# Patient Record
Sex: MALE | Race: ASIAN | Employment: STUDENT | URBAN - METROPOLITAN AREA
[De-identification: names, ages, dates, MRNs, and addresses within clinical notes are randomized per-mention and may not be internally consistent; named-entity substitution may affect disease eponyms.]

---

## 2018-12-31 ENCOUNTER — APPOINTMENT (OUTPATIENT)
Dept: LAB | Facility: HOSPITAL | Age: 33
End: 2018-12-31
Attending: PHYSICIAN ASSISTANT
Payer: COMMERCIAL

## 2018-12-31 ENCOUNTER — OFFICE VISIT (OUTPATIENT)
Dept: INTERNAL MEDICINE CLINIC | Facility: CLINIC | Age: 33
End: 2018-12-31
Payer: COMMERCIAL

## 2018-12-31 VITALS
HEIGHT: 64 IN | WEIGHT: 151 LBS | HEART RATE: 114 BPM | SYSTOLIC BLOOD PRESSURE: 138 MMHG | DIASTOLIC BLOOD PRESSURE: 83 MMHG | BODY MASS INDEX: 25.78 KG/M2

## 2018-12-31 DIAGNOSIS — Z23 NEED FOR VACCINATION: ICD-10-CM

## 2018-12-31 DIAGNOSIS — Z11.1 SCREENING-PULMONARY TB: ICD-10-CM

## 2018-12-31 DIAGNOSIS — Z76.89 ENCOUNTER TO ESTABLISH CARE: Primary | ICD-10-CM

## 2018-12-31 DIAGNOSIS — Z01.84 IMMUNITY STATUS TESTING: ICD-10-CM

## 2018-12-31 LAB — RUBV IGG SER-ACNC: 38.7 IU/ML

## 2018-12-31 PROCEDURE — 90472 IMMUNIZATION ADMIN EACH ADD: CPT | Performed by: PHYSICIAN ASSISTANT

## 2018-12-31 PROCEDURE — 90715 TDAP VACCINE 7 YRS/> IM: CPT | Performed by: PHYSICIAN ASSISTANT

## 2018-12-31 PROCEDURE — 86735 MUMPS ANTIBODY: CPT

## 2018-12-31 PROCEDURE — 99203 OFFICE O/P NEW LOW 30 MIN: CPT | Performed by: PHYSICIAN ASSISTANT

## 2018-12-31 PROCEDURE — 36415 COLL VENOUS BLD VENIPUNCTURE: CPT

## 2018-12-31 PROCEDURE — 99212 OFFICE O/P EST SF 10 MIN: CPT | Performed by: PHYSICIAN ASSISTANT

## 2018-12-31 PROCEDURE — 86787 VARICELLA-ZOSTER ANTIBODY: CPT

## 2018-12-31 PROCEDURE — 86762 RUBELLA ANTIBODY: CPT

## 2018-12-31 PROCEDURE — 90686 IIV4 VACC NO PRSV 0.5 ML IM: CPT | Performed by: PHYSICIAN ASSISTANT

## 2018-12-31 PROCEDURE — 86706 HEP B SURFACE ANTIBODY: CPT

## 2018-12-31 PROCEDURE — 86580 TB INTRADERMAL TEST: CPT | Performed by: PHYSICIAN ASSISTANT

## 2018-12-31 PROCEDURE — 86765 RUBEOLA ANTIBODY: CPT

## 2018-12-31 PROCEDURE — 90471 IMMUNIZATION ADMIN: CPT | Performed by: PHYSICIAN ASSISTANT

## 2018-12-31 NOTE — PROGRESS NOTES
HPI:    Patient ID: Jena Cortez is a 26year old male. 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   No significant past medical histo and reactive to light. Neck: Normal range of motion. Neck supple. No thyromegaly present. Cardiovascular: Normal rate, regular rhythm, normal heart sounds and intact distal pulses. Edema not present.   Pulmonary/Chest: Effort normal and breath sound No prescriptions requested or ordered in this encounter       Imaging & Referrals:  TETANUS, DIPHTHERIA TOXOIDS AND ACELLULAR PERTUSIS VACCINE (TDAP), >7 YEARS, IM USE  FLULAVAL INFLUENZA VACCINE QUAD PRESERVATIVE FREE 0.5 ML         #[id number]

## 2019-01-01 LAB
HBV SURFACE AB SER-ACNC: 3.54 MIU/ML (ref ?–10)
HBV SURFACE AG SERPL QL IA: NONREACTIVE

## 2019-01-02 ENCOUNTER — NURSE ONLY (OUTPATIENT)
Dept: INTERNAL MEDICINE CLINIC | Facility: CLINIC | Age: 34
End: 2019-01-02
Payer: COMMERCIAL

## 2019-01-02 DIAGNOSIS — Z11.1 SCREENING-PULMONARY TB: Primary | ICD-10-CM

## 2019-01-02 LAB
INDURATION (): 0 MM (ref 0–11)
MEV IGG SER-ACNC: >300 AU/ML (ref 30–?)
MUV IGG SER IA-ACNC: 32.8 AU/ML (ref 11–?)
VZV IGG SER IA-ACNC: 1175 (ref 165–?)

## 2019-01-02 NOTE — PROGRESS NOTES
Pt presents for TB read, s/p placement on 12/31 to right forearm. No induration noted to right forearm. TB read as negative.

## 2019-01-03 ENCOUNTER — NURSE ONLY (OUTPATIENT)
Dept: INTERNAL MEDICINE CLINIC | Facility: CLINIC | Age: 34
End: 2019-01-03

## 2019-01-03 DIAGNOSIS — Z78.9 NOT IMMUNE TO HEPATITIS B VIRUS: Primary | ICD-10-CM

## 2019-01-03 PROCEDURE — 90746 HEPB VACCINE 3 DOSE ADULT IM: CPT | Performed by: PHYSICIAN ASSISTANT

## 2019-01-03 PROCEDURE — 90471 IMMUNIZATION ADMIN: CPT | Performed by: PHYSICIAN ASSISTANT

## 2019-01-03 NOTE — PROGRESS NOTES
Pt presents for Hepatitis B booster d/t abnormal titer results. 1mL of Hepatitis B vaccine administered to right upper outer gluteus. Pt tolerated injection well. No adverse reactions noted.    Per Marcelle's verbal instructions pt should recheck titer in 1 m

## 2019-01-04 ENCOUNTER — TELEPHONE (OUTPATIENT)
Dept: INTERNAL MEDICINE CLINIC | Facility: CLINIC | Age: 34
End: 2019-01-04

## 2019-01-04 NOTE — TELEPHONE ENCOUNTER
Patient needs order for the second part for TB and for mumps vaccination. Please call patient when order is ready.

## 2019-01-07 ENCOUNTER — NURSE ONLY (OUTPATIENT)
Dept: INTERNAL MEDICINE CLINIC | Facility: CLINIC | Age: 34
End: 2019-01-07

## 2019-01-07 DIAGNOSIS — Z11.1 SCREENING-PULMONARY TB: ICD-10-CM

## 2019-01-07 PROCEDURE — 86580 TB INTRADERMAL TEST: CPT | Performed by: INTERNAL MEDICINE

## 2019-01-07 NOTE — TELEPHONE ENCOUNTER
He is immune to mumps and does not need a booster. He will however need to recheck titers for Hepatitis B in about 3-4 weeks. Orders generated. He can have the second step TB test at that time.

## 2019-01-07 NOTE — PROGRESS NOTES
Pt verified name and . Pt arrived for a nurse visit to have his 2nd TB Placement. TB Placement was placed on the right forearm. Pt tolerated injection well with no reactions.   Informed him to come back to office in 48-72 hrs for another nurse visit f

## 2019-01-09 ENCOUNTER — NURSE ONLY (OUTPATIENT)
Dept: INTERNAL MEDICINE CLINIC | Facility: CLINIC | Age: 34
End: 2019-01-09

## 2019-01-09 DIAGNOSIS — Z11.1 SCREENING-PULMONARY TB: Primary | ICD-10-CM

## 2019-01-09 LAB — INDURATION (): 0 MM (ref 0–11)

## 2019-01-09 NOTE — PROGRESS NOTES
Patient came in today for his TB read. Patient verified  and name. Tb was placed on right forearm on 19. Results were 0mm negative. Copy of results given to patient.

## 2019-02-04 ENCOUNTER — APPOINTMENT (OUTPATIENT)
Dept: LAB | Facility: HOSPITAL | Age: 34
End: 2019-02-04
Attending: PHYSICIAN ASSISTANT
Payer: COMMERCIAL

## 2019-02-04 DIAGNOSIS — Z78.9 NOT IMMUNE TO HEPATITIS B VIRUS: ICD-10-CM

## 2019-02-04 PROCEDURE — 36415 COLL VENOUS BLD VENIPUNCTURE: CPT

## 2019-02-04 PROCEDURE — 86706 HEP B SURFACE ANTIBODY: CPT

## 2019-02-05 LAB
HBV SURFACE AB SER-ACNC: 117.14 MIU/ML (ref ?–10)
HBV SURFACE AG SERPL QL IA: REACTIVE

## 2019-03-21 ENCOUNTER — WALK IN (OUTPATIENT)
Dept: URGENT CARE | Age: 34
End: 2019-03-21

## 2019-03-21 VITALS
HEART RATE: 104 BPM | TEMPERATURE: 98.7 F | SYSTOLIC BLOOD PRESSURE: 122 MMHG | DIASTOLIC BLOOD PRESSURE: 82 MMHG | HEIGHT: 64 IN | BODY MASS INDEX: 25.61 KG/M2 | WEIGHT: 150 LBS | RESPIRATION RATE: 18 BRPM

## 2019-03-21 DIAGNOSIS — H66.91 RIGHT OTITIS MEDIA, UNSPECIFIED OTITIS MEDIA TYPE: Primary | ICD-10-CM

## 2019-03-21 PROCEDURE — X0943 AMG SELF PAY VISIT: HCPCS | Performed by: NURSE PRACTITIONER

## 2019-03-21 RX ORDER — AMOXICILLIN 875 MG/1
875 TABLET, COATED ORAL 2 TIMES DAILY
Qty: 20 TABLET | Refills: 0 | Status: SHIPPED | OUTPATIENT
Start: 2019-03-21 | End: 2019-03-21 | Stop reason: CLARIF

## 2019-03-21 RX ORDER — AMOXICILLIN 875 MG/1
875 TABLET, COATED ORAL 2 TIMES DAILY
Qty: 20 TABLET | Refills: 0 | Status: SHIPPED | OUTPATIENT
Start: 2019-03-21 | End: 2019-03-31

## 2019-03-21 ASSESSMENT — ENCOUNTER SYMPTOMS
ENDOCRINE NEGATIVE: 1
ALLERGIC/IMMUNOLOGIC NEGATIVE: 1
NEUROLOGICAL NEGATIVE: 1
PSYCHIATRIC NEGATIVE: 1
GASTROINTESTINAL NEGATIVE: 1
EYES NEGATIVE: 1
RESPIRATORY NEGATIVE: 1
CONSTITUTIONAL NEGATIVE: 1
HEMATOLOGIC/LYMPHATIC NEGATIVE: 1

## 2019-04-09 ENCOUNTER — OFFICE VISIT (OUTPATIENT)
Dept: INTERNAL MEDICINE CLINIC | Facility: CLINIC | Age: 34
End: 2019-04-09

## 2019-04-09 VITALS
WEIGHT: 148 LBS | DIASTOLIC BLOOD PRESSURE: 76 MMHG | HEIGHT: 64 IN | RESPIRATION RATE: 16 BRPM | SYSTOLIC BLOOD PRESSURE: 126 MMHG | HEART RATE: 78 BPM | BODY MASS INDEX: 25.27 KG/M2 | OXYGEN SATURATION: 98 % | TEMPERATURE: 98 F

## 2019-04-09 DIAGNOSIS — Z00.00 ROUTINE HEALTH MAINTENANCE: ICD-10-CM

## 2019-04-09 DIAGNOSIS — H93.8X1 EAR PRESSURE, RIGHT: Primary | ICD-10-CM

## 2019-04-09 DIAGNOSIS — H93.11 TINNITUS OF RIGHT EAR: ICD-10-CM

## 2019-04-09 PROCEDURE — 99214 OFFICE O/P EST MOD 30 MIN: CPT | Performed by: INTERNAL MEDICINE

## 2019-04-09 PROCEDURE — 99212 OFFICE O/P EST SF 10 MIN: CPT | Performed by: INTERNAL MEDICINE

## 2019-04-09 RX ORDER — AMOXICILLIN 875 MG/1
TABLET, COATED ORAL
Refills: 0 | COMMUNITY
Start: 2019-03-21 | End: 2019-04-09 | Stop reason: ALTCHOICE

## 2019-04-09 NOTE — PROGRESS NOTES
Amos Cardona is a 37year old male. HPI:   Patient presents with:  Ear Pain: Patient bilateral ear pain with most to right ear. Pt went to [place] [id number] and was prescribed Amoxicillin that he has completed. Pt states antibiotics only helped slightly. Voices no  headaches or dizziness    EXAM:   /76   Pulse 78   Temp 98.2 °F (36.8 °C) (Oral)   Resp 16   Ht 5' 4\" (1.626 m)   Wt 148 lb (67.1 kg)   SpO2 98%   BMI 25.40 kg/m²     GENERAL:  well developed, well nourished, in no apparent distress  SKIN

## 2019-12-29 ENCOUNTER — WALK IN (OUTPATIENT)
Dept: URGENT CARE | Age: 34
End: 2019-12-29

## 2019-12-29 VITALS
HEART RATE: 115 BPM | BODY MASS INDEX: 24.16 KG/M2 | TEMPERATURE: 99.3 F | WEIGHT: 145 LBS | HEIGHT: 65 IN | RESPIRATION RATE: 16 BRPM | OXYGEN SATURATION: 98 %

## 2019-12-29 DIAGNOSIS — J02.9 PHARYNGITIS, UNSPECIFIED ETIOLOGY: Primary | ICD-10-CM

## 2019-12-29 DIAGNOSIS — J11.1 INFLUENZA: ICD-10-CM

## 2019-12-29 LAB
INTERNAL PROCEDURAL CONTROLS ACCEPTABLE: YES
S PYO AG THROAT QL IA.RAPID: NEGATIVE

## 2019-12-29 PROCEDURE — X0943 AMG SELF PAY VISIT: HCPCS | Performed by: NURSE PRACTITIONER

## 2019-12-29 RX ORDER — AMOXICILLIN 875 MG/1
875 TABLET, COATED ORAL 2 TIMES DAILY
Qty: 20 TABLET | Refills: 0 | Status: SHIPPED | OUTPATIENT
Start: 2019-12-29 | End: 2020-01-08

## 2019-12-29 RX ORDER — OSELTAMIVIR PHOSPHATE 75 MG/1
75 CAPSULE ORAL 2 TIMES DAILY
Qty: 10 CAPSULE | Refills: 0 | Status: SHIPPED | OUTPATIENT
Start: 2019-12-29 | End: 2020-01-03

## 2019-12-29 ASSESSMENT — ENCOUNTER SYMPTOMS
SINUS PRESSURE: 0
GASTROINTESTINAL NEGATIVE: 1
HEADACHES: 0
COUGH: 1
DIZZINESS: 0
VOICE CHANGE: 1
EYES NEGATIVE: 1
TROUBLE SWALLOWING: 1
SINUS PAIN: 0
RHINORRHEA: 1
FATIGUE: 0
CHILLS: 0
NAUSEA: 0
WHEEZING: 0
SORE THROAT: 1
SHORTNESS OF BREATH: 0
ALLERGIC/IMMUNOLOGIC NEGATIVE: 1
FEVER: 0
CONSTITUTIONAL NEGATIVE: 1

## 2022-09-13 ENCOUNTER — WALK IN (OUTPATIENT)
Dept: URGENT CARE | Age: 37
End: 2022-09-13

## 2022-09-13 VITALS
WEIGHT: 148 LBS | OXYGEN SATURATION: 99 % | TEMPERATURE: 98 F | RESPIRATION RATE: 17 BRPM | HEIGHT: 65 IN | BODY MASS INDEX: 24.66 KG/M2 | HEART RATE: 97 BPM

## 2022-09-13 DIAGNOSIS — H69.91 DYSFUNCTION OF RIGHT EUSTACHIAN TUBE: Primary | ICD-10-CM

## 2022-09-13 PROCEDURE — X0943 AMG SELF PAY VISIT: HCPCS | Performed by: NURSE PRACTITIONER

## 2022-09-13 ASSESSMENT — ENCOUNTER SYMPTOMS
SINUS PRESSURE: 0
EYES NEGATIVE: 1
RESPIRATORY NEGATIVE: 1
RHINORRHEA: 0
COUGH: 0
CONSTITUTIONAL NEGATIVE: 1
FEVER: 0
SINUS PAIN: 0
SORE THROAT: 0
FATIGUE: 0
HEADACHES: 1

## (undated) NOTE — LETTER
1/9/2019              Olman Jaquez        [address] [facility]         To Whom it may concern:    Olman Jaquez has had tuberculin purified protein derivative (PPD) tests as listed below.     INDURATION (PPD)   Date Value Ref Rang